# Patient Record
Sex: MALE | Race: WHITE | NOT HISPANIC OR LATINO | Employment: OTHER | URBAN - METROPOLITAN AREA
[De-identification: names, ages, dates, MRNs, and addresses within clinical notes are randomized per-mention and may not be internally consistent; named-entity substitution may affect disease eponyms.]

---

## 2017-06-02 PROBLEM — R07.9 CHEST PAIN: Status: ACTIVE | Noted: 2017-06-02

## 2017-11-26 ENCOUNTER — OFFICE VISIT (OUTPATIENT)
Dept: URGENT CARE | Facility: CLINIC | Age: 77
End: 2017-11-26
Payer: MEDICARE

## 2017-11-26 VITALS
SYSTOLIC BLOOD PRESSURE: 131 MMHG | OXYGEN SATURATION: 97 % | DIASTOLIC BLOOD PRESSURE: 73 MMHG | TEMPERATURE: 97 F | RESPIRATION RATE: 14 BRPM | HEART RATE: 59 BPM

## 2017-11-26 DIAGNOSIS — M70.21 OLECRANON BURSITIS OF RIGHT ELBOW: Primary | ICD-10-CM

## 2017-11-26 PROCEDURE — 99203 OFFICE O/P NEW LOW 30 MIN: CPT | Mod: S$GLB,,, | Performed by: PHYSICIAN ASSISTANT

## 2017-11-26 NOTE — PROGRESS NOTES
Subjective:       Patient ID: Indra Easley is a 76 y.o. male.    Vitals:  oral temperature is 97.1 °F (36.2 °C). His blood pressure is 131/73 and his pulse is 59 (abnormal). His respiration is 14 and oxygen saturation is 97%.     Chief Complaint: Joint Swelling    PT C/O RIGHT ELBOW SWELLING, NO KNOWN INJURY, TENDER TO TOUCH, PAIN WITH FLEXION    Arm Pain    The incident occurred 3 to 5 days ago. There was no injury mechanism. The pain is present in the right elbow. The quality of the pain is described as aching. The pain does not radiate. The pain is at a severity of 4/10. The pain is mild. Pertinent negatives include no chest pain, muscle weakness, numbness or tingling. Nothing aggravates the symptoms. He has tried NSAIDs for the symptoms. The treatment provided mild relief.     Review of Systems   Constitution: Negative for chills and fever.   HENT: Negative for sore throat.    Eyes: Negative for blurred vision.   Cardiovascular: Negative for chest pain.   Respiratory: Negative for shortness of breath.    Skin: Negative for rash.   Musculoskeletal: Positive for joint pain and joint swelling. Negative for back pain.   Gastrointestinal: Negative for abdominal pain, diarrhea, nausea and vomiting.   Neurological: Negative for headaches, numbness and tingling.   Psychiatric/Behavioral: The patient is not nervous/anxious.        Objective:      Physical Exam   Constitutional: He is oriented to person, place, and time. He appears well-developed and well-nourished. He is cooperative.  Non-toxic appearance. He does not appear ill. No distress.   HENT:   Head: Normocephalic and atraumatic.   Right Ear: Hearing, tympanic membrane, external ear and ear canal normal.   Left Ear: Hearing, tympanic membrane, external ear and ear canal normal.   Nose: Nose normal. No mucosal edema, rhinorrhea or nasal deformity. No epistaxis. Right sinus exhibits no maxillary sinus tenderness and no frontal sinus tenderness. Left sinus  exhibits no maxillary sinus tenderness and no frontal sinus tenderness.   Mouth/Throat: Uvula is midline, oropharynx is clear and moist and mucous membranes are normal. No trismus in the jaw. Normal dentition. No uvula swelling. No posterior oropharyngeal erythema.   Eyes: Conjunctivae and lids are normal. Right eye exhibits no discharge. Left eye exhibits no discharge. No scleral icterus.   Sclera clear bilat   Neck: Trachea normal, normal range of motion, full passive range of motion without pain and phonation normal. Neck supple.   Cardiovascular: Normal rate, regular rhythm, normal heart sounds, intact distal pulses and normal pulses.    Pulmonary/Chest: Effort normal and breath sounds normal. No respiratory distress.   Abdominal: Soft. Normal appearance and bowel sounds are normal. He exhibits no distension, no pulsatile midline mass and no mass. There is no tenderness.   Musculoskeletal: Normal range of motion. He exhibits no edema.        Right elbow: He exhibits swelling. He exhibits normal range of motion, no effusion, no deformity and no laceration. Tenderness found. Olecranon process tenderness noted. No radial head, no medial epicondyle and no lateral epicondyle tenderness noted.        Right wrist: He exhibits normal range of motion, no tenderness, no bony tenderness, no swelling, no effusion, no crepitus, no deformity and no laceration.        Right forearm: He exhibits no tenderness, no bony tenderness, no swelling, no edema, no deformity and no laceration.        Arms:       Right hand: He exhibits normal range of motion, no tenderness, no bony tenderness, normal two-point discrimination, normal capillary refill, no deformity, no laceration and no swelling. Normal sensation noted. Normal strength noted.   Mild pain with full flexion of R elbow; Edema noted over R olecranon process without erythema or warmth   Neurological: He is alert and oriented to person, place, and time. He exhibits normal muscle  tone. Coordination normal.   Skin: Skin is warm, dry and intact. He is not diaphoretic. No pallor.   Psychiatric: He has a normal mood and affect. His speech is normal and behavior is normal. Judgment and thought content normal. Cognition and memory are normal.   Nursing note and vitals reviewed.      Assessment:       1. Olecranon bursitis of right elbow        Plan:         Olecranon bursitis of right elbow        Bursitis of the Elbow (Olecranon)  Your elbow joint contains a small fluid-filled sac called a bursa. The bursa helps the muscles and tendons move smoothly over the bone. It also cushions and protects your elbow. Bursitis is when the bursa is inflamed or swollen. This is most often due to overuse of or injury to the elbow. Symptoms include swelling and pain. If the elbow is red and feels warm to the touch, the bursa itself may be infected.  In most cases, elbow bursitis resolves with medicine and self-care at home. It may take several weeks for the bursa to heal and the swelling to go away. In some cases, your healthcare provider may drain excess fluid from the bursa. Or, he or she may inject medicine directly into the bursa to help relieve symptoms. In severe cases, you may need surgery to remove the bursa may. If there is concern that the bursa is infected, your healthcare provider may prescribe antibiotics to treat the infection.    Home care  Your healthcare provider may prescribe medicine to help relieve pain and swelling. This may be an over-the-counter pain reliever or prescription pain medicine. Take all medicines as directed. To help treat or prevent infection, your provider may prescribe antibiotics. If these are prescribed, take them as directed until they are gone.  The following are general care guidelines:  · Apply an ice pack or bag of frozen peas wrapped in a thin towel to your elbow for 15 to 20 minutes at a time. Do this 3 to 4 times a day until pain and swelling improve.  · Keep your  elbow raised above the level of your heart whenever possible. This helps reduce swelling. When sitting or lying down, place your arm on a pillow that rests on your chest or on a pillow at your side.  · Use an elastic wrap around the elbow joint to compress the area while it is healing. Make the wrap snug but not tight to the point of causing pain.  · Rest your elbow to give it time to heal. You may need to wear an elbow pad to help protect and limit the movement of your elbow. During and after healing, avoid leaning on your elbows.  Follow-up care  Follow up with your healthcare provider, or as advised. If you have been referred to a specialist, make that appointment promptly.  When to seek medical advice  Call your healthcare provider right away if any of these occur:  · Fever of 100.4°F (38°C) or higher, or as advised  · Chills  · Increased pain, swelling, warmth, redness, or drainage from the joint  · Trouble moving the elbow joint  · Numbness or tingling in the hand  · Severe pain or swelling in forearm or hand  · Loss of pink color and slow return of color after squeezing fingertip or hand  Date Last Reviewed: 6/1/2016  © 8658-3843 [employer]. [address] [place] [address], [place] [address]. All rights reserved. This information is not intended as a substitute for professional medical care. Always follow your healthcare professional's instructions.      Please follow up with your Primary care provider within 2-5 days if your signs and symptoms have not resolved or worsen.     If your condition worsens or fails to improve we recommend that you receive another evaluation at the emergency room immediately or contact your primary medical clinic to discuss your concerns.   You must understand that you have received an Urgent Care treatment only and that you may be released before all of your medical problems are known or treated. You, the patient, will arrange for follow up care as instructed.

## 2017-11-26 NOTE — PATIENT INSTRUCTIONS
Bursitis of the Elbow (Olecranon)  Your elbow joint contains a small fluid-filled sac called a bursa. The bursa helps the muscles and tendons move smoothly over the bone. It also cushions and protects your elbow. Bursitis is when the bursa is inflamed or swollen. This is most often due to overuse of or injury to the elbow. Symptoms include swelling and pain. If the elbow is red and feels warm to the touch, the bursa itself may be infected.  In most cases, elbow bursitis resolves with medicine and self-care at home. It may take several weeks for the bursa to heal and the swelling to go away. In some cases, your healthcare provider may drain excess fluid from the bursa. Or, he or she may inject medicine directly into the bursa to help relieve symptoms. In severe cases, you may need surgery to remove the bursa may. If there is concern that the bursa is infected, your healthcare provider may prescribe antibiotics to treat the infection.    Home care  Your healthcare provider may prescribe medicine to help relieve pain and swelling. This may be an over-the-counter pain reliever or prescription pain medicine. Take all medicines as directed. To help treat or prevent infection, your provider may prescribe antibiotics. If these are prescribed, take them as directed until they are gone.  The following are general care guidelines:  · Apply an ice pack or bag of frozen peas wrapped in a thin towel to your elbow for 15 to 20 minutes at a time. Do this 3 to 4 times a day until pain and swelling improve.  · Keep your elbow raised above the level of your heart whenever possible. This helps reduce swelling. When sitting or lying down, place your arm on a pillow that rests on your chest or on a pillow at your side.  · Use an elastic wrap around the elbow joint to compress the area while it is healing. Make the wrap snug but not tight to the point of causing pain.  · Rest your elbow to give it time to heal. You may need to wear an  elbow pad to help protect and limit the movement of your elbow. During and after healing, avoid leaning on your elbows.  Follow-up care  Follow up with your healthcare provider, or as advised. If you have been referred to a specialist, make that appointment promptly.  When to seek medical advice  Call your healthcare provider right away if any of these occur:  · Fever of 100.4°F (38°C) or higher, or as advised  · Chills  · Increased pain, swelling, warmth, redness, or drainage from the joint  · Trouble moving the elbow joint  · Numbness or tingling in the hand  · Severe pain or swelling in forearm or hand  · Loss of pink color and slow return of color after squeezing fingertip or hand  Date Last Reviewed: 6/1/2016  © 0053-6655 [employer]. [address] [place] [address], [place] [address]. All rights reserved. This information is not intended as a substitute for professional medical care. Always follow your healthcare professional's instructions.      Please follow up with your Primary care provider within 2-5 days if your signs and symptoms have not resolved or worsen.     If your condition worsens or fails to improve we recommend that you receive another evaluation at the emergency room immediately or contact your primary medical clinic to discuss your concerns.   You must understand that you have received an Urgent Care treatment only and that you may be released before all of your medical problems are known or treated. You, the patient, will arrange for follow up care as instructed.

## 2017-11-28 ENCOUNTER — OFFICE VISIT (OUTPATIENT)
Dept: URGENT CARE | Facility: CLINIC | Age: 77
End: 2017-11-28
Payer: MEDICARE

## 2017-11-28 VITALS
BODY MASS INDEX: 33.13 KG/M2 | OXYGEN SATURATION: 96 % | DIASTOLIC BLOOD PRESSURE: 72 MMHG | SYSTOLIC BLOOD PRESSURE: 128 MMHG | WEIGHT: 250 LBS | HEART RATE: 62 BPM | HEIGHT: 73 IN | TEMPERATURE: 97 F | RESPIRATION RATE: 16 BRPM

## 2017-11-28 DIAGNOSIS — R23.3 ABNORMAL BRUISING: Primary | ICD-10-CM

## 2017-11-28 PROCEDURE — 99213 OFFICE O/P EST LOW 20 MIN: CPT | Mod: S$GLB,,, | Performed by: INTERNAL MEDICINE

## 2017-11-28 RX ORDER — FUROSEMIDE 20 MG/1
1 TABLET ORAL
COMMUNITY
Start: 2017-09-25 | End: 2019-11-07

## 2017-11-28 RX ORDER — CLOPIDOGREL BISULFATE 75 MG/1
1 TABLET ORAL DAILY
COMMUNITY
Start: 2017-08-22 | End: 2018-11-28

## 2017-11-28 NOTE — PROGRESS NOTES
"Subjective:       Patient ID: Indra Easley is a 76 y.o. male.    Vitals:  height is 6' 1" (1.854 m) and weight is 113.4 kg (250 lb). His oral temperature is 97.4 °F (36.3 °C). His blood pressure is 128/72 and his pulse is 62. His respiration is 16 and oxygen saturation is 96%.     Chief Complaint: Edema (Right forearm swelling )    Patient was seen 3 days ago for bursitis of the right elbow, patient now has bruising and swelling on right forearm due to the use of an ace wrap.  He has been using ice pack as well for bursal swelling, which has improved.  Today he noted swelling and bruising along his right forearm. He has been on Plavix for many years.       Edema   This is a new problem. The current episode started today. The problem occurs constantly. The problem has been gradually worsening. Pertinent negatives include no abdominal pain, chest pain, chills, fever, headaches, joint swelling, nausea, rash or vomiting.     Review of Systems   Constitution: Negative for chills and fever.   Eyes: Negative for blurred vision.   Cardiovascular: Negative for chest pain.   Respiratory: Negative for shortness of breath.    Skin: Negative for rash.   Musculoskeletal: Negative for back pain, joint pain and joint swelling.   Gastrointestinal: Negative for abdominal pain, diarrhea, nausea and vomiting.   Neurological: Negative for headaches.   Psychiatric/Behavioral: The patient is not nervous/anxious.        Objective:      Physical Exam   Musculoskeletal:   Right forearm:  Diffuse swelling and ecchymosis noted medially.  Good brachial and radial pulses.  Normal ROM of wrist and elbow.  No significant TTP, erythema or increase warmth.        Assessment:       1. Abnormal bruising        Plan:         Abnormal bruising      "

## 2017-11-28 NOTE — PATIENT INSTRUCTIONS
Soft Tissue Contusion  You have a contusion. This is also called a bruise. There is swelling and some bleeding under the skin. This injury generally takes a few days to a few weeks to heal.  During that time, the bruise will typically change in color from reddish, to purple-blue, to greenish-yellow, then to yellow-brown.  Home care  · Elevate the injured area to reduce pain and swelling. As much as possible, sit or lie down with the injured area raised about the level of your heart. This is especially important during the first 48 hours.  · Ice the injured area to help reduce pain and swelling. Wrap a cold source (ice pack or ice cubes in a plastic bag) in a thin towel. Apply to the bruised area for 20 minutes every 1 to 2 hours the first day. Continue this 3 to 4 times a day until the pain and swelling goes away.  · Unless another medication was prescribed, you can take acetaminophen, ibuprofen, or naproxen to control pain. (If you have chronic liver or kidney disease or ever had a stomach ulcer or GI bleeding, talk with your doctor before using these medicines.)  Follow up  Follow up with your health care provider or our staff as advised. Call if you are not better in 1 to 2 weeks.  When to seek medical advice   Call your health care provider right away if you have any of the following:  · Increased pain or swelling  · Bruise is on an arm or leg and arm or leg becomes cold, blue, numb or tingly  · Signs of infection: Warmth, drainage, or increased redness or pain around the contusion  · Inability to move the injured area or body part   · Bruise is near your eye and you have problems with your eyesight or eye   · Frequent bruising for unknown reasons    After icing for 48 hours, continue elevation with moist heat.

## 2017-11-29 ENCOUNTER — TELEPHONE (OUTPATIENT)
Dept: URGENT CARE | Facility: CLINIC | Age: 77
End: 2017-11-29

## 2018-11-28 PROBLEM — R07.9 CHEST PAIN: Status: RESOLVED | Noted: 2017-06-02 | Resolved: 2018-11-28

## 2019-10-09 ENCOUNTER — PATIENT MESSAGE (OUTPATIENT)
Dept: UROLOGY | Facility: CLINIC | Age: 79
End: 2019-10-09

## 2020-01-03 PROBLEM — R07.89 OTHER CHEST PAIN: Status: ACTIVE | Noted: 2020-01-03

## 2020-01-14 ENCOUNTER — OFFICE VISIT (OUTPATIENT)
Dept: NEPHROLOGY | Facility: CLINIC | Age: 80
End: 2020-01-14
Payer: MEDICARE

## 2020-01-14 VITALS
SYSTOLIC BLOOD PRESSURE: 98 MMHG | OXYGEN SATURATION: 98 % | HEART RATE: 62 BPM | BODY MASS INDEX: 34.01 KG/M2 | DIASTOLIC BLOOD PRESSURE: 66 MMHG | WEIGHT: 256.63 LBS | HEIGHT: 73 IN

## 2020-01-14 DIAGNOSIS — N28.1 RENAL CYST: Primary | ICD-10-CM

## 2020-01-14 PROCEDURE — 1159F MED LIST DOCD IN RCRD: CPT | Mod: ,,, | Performed by: INTERNAL MEDICINE

## 2020-01-14 PROCEDURE — 1159F PR MEDICATION LIST DOCUMENTED IN MEDICAL RECORD: ICD-10-PCS | Mod: ,,, | Performed by: INTERNAL MEDICINE

## 2020-01-14 PROCEDURE — 99204 PR OFFICE/OUTPT VISIT, NEW, LEVL IV, 45-59 MIN: ICD-10-PCS | Mod: S$PBB,,, | Performed by: INTERNAL MEDICINE

## 2020-01-14 PROCEDURE — 99999 PR PBB SHADOW E&M-EST. PATIENT-LVL III: CPT | Mod: PBBFAC,,, | Performed by: INTERNAL MEDICINE

## 2020-01-14 PROCEDURE — 99204 OFFICE O/P NEW MOD 45 MIN: CPT | Mod: S$PBB,,, | Performed by: INTERNAL MEDICINE

## 2020-01-14 PROCEDURE — 99213 OFFICE O/P EST LOW 20 MIN: CPT | Mod: PBBFAC,PO | Performed by: INTERNAL MEDICINE

## 2020-01-14 PROCEDURE — 99999 PR PBB SHADOW E&M-EST. PATIENT-LVL III: ICD-10-PCS | Mod: PBBFAC,,, | Performed by: INTERNAL MEDICINE

## 2020-01-14 RX ORDER — RANOLAZINE 500 MG/1
500 TABLET, EXTENDED RELEASE ORAL 2 TIMES DAILY
COMMUNITY
Start: 2020-01-13 | End: 2020-05-14

## 2020-01-14 NOTE — LETTER
January 14, 2020      Keven Wright MD  [address] [facility] [address]           [facility]  [address] [place] [address]  Phone: [phone]          Patient: Indra Easley   MR Number: [id number]   YOB: 1940   Date of Visit: 1/14/2020       Dear Dr. Keven Wright:    Thank you for referring Indra Easley to me for evaluation. Attached you will find relevant portions of my assessment and plan of care.    If you have questions, please do not hesitate to call me. I look forward to following Indra Easley along with you.    Sincerely,    Chico Zarco MD    Enclosure  CC:  No Recipients    If you would like to receive this communication electronically, please contact [email or web address] or [phone] to request more information on [email or web address] Link access.    For providers and/or their staff who would like to refer a patient to [facility], please contact us through our one-stop-shop provider referral line, [facility] , at [phone].    If you feel you have received this communication in error or would no longer like to receive these types of communications, please e-mail [email or web address]

## 2020-01-14 NOTE — PROGRESS NOTES
Subjective:       Patient ID: Indra Easley is a 79 y.o. White male who presents for new patient evaluation for chronic renal failure.    Indra Ealsey is referred by Andry Umana MD to be evaluated for chronic renal failure.  He has HTN and CAD.  He was found recently to have a large renal cyst on ultrasound.  He has no uremic or urinary symptoms and is in his usual state of health.  There have been no recent illnesses, hospitalizations or procedures.  He denies NSAIDs.      Review of Systems   Constitutional: Negative for appetite change, chills and fever.   Eyes: Negative for visual disturbance.   Respiratory: Negative for cough and shortness of breath.    Cardiovascular: Positive for chest pain (in december). Negative for leg swelling.   Gastrointestinal: Negative for diarrhea, nausea and vomiting.   Genitourinary: Negative for difficulty urinating, dysuria and hematuria.   Musculoskeletal: Positive for arthralgias (hands). Negative for myalgias.   Skin: Negative for rash.   Neurological: Negative for headaches.   Psychiatric/Behavioral: Negative for sleep disturbance.       The past medical, family and social histories were reviewed for this encounter.     Past Medical History:   Diagnosis Date    a CAD With H/O CABG In 2000 And Stenting     Dr. Keven Wright; He Had 2 More Stents In 11/2018 At [facility]; 1/27/17 [facility]/Angiogram (Dr. HANSA Wright) = (See Scanned Report); 9/20/16 LCST = Normal    a H/O Cardiac Arrest 07/2015 During [facility] Procedure     Dr. Keven Wright    a Mild Chronic Systolic Congestive Heart Failure     Dr. Keven Wright; 1/27/17 [facility]/Angiogram (Dr. HANSA Wright) = (See Scanned Report) Included EF 45-50% By Ventriculography    a Transient AFib During 07/2015 [facility] Procedure     Dr. Keven Wright    b Hypertension     c Hypercholesterolemia     Dr. Keven Wright Has Him On Repatha SQ Every Weeks; He Had Been On Crestor 10 Mg And Zetia; 5/10/16 Homocysteine Level = Normal    d Hyperglycemia      "11/7/19 And 10/2015 RXd Lifestyle Changes    e Bilateral Small Thyroid Cysts     3/8/17 Ordered A Repeat Thyroid U/S In 1 Month; 3/8/17 Neck U/S = (See Report)    f Weight Gain 03/2017     3/8/17 RXd Lifestyle Changes    i Obstructive Sleep Apnea (Possible Diagnosis)     j Chronic Abdominal Bloating     Dr. David Kidd On 10/27/16 Ordered Labs And F/U Clinic OV    j Gallbladder Sludge     Dr. David Kidd; 9/20/16 ABD U/S = GB Sludge And Multiple Small Bilateral renal Cysts    j GERD     Dr. David Kidd; Last EGD Was In 02/2016    j Gilbert's syndrome     Dr. David Kidd    j H/O Colon Polyps On 3/6/15 TC     Dr. David Kidd: "Repeat Tc In 5 YRs"    k Bilateral Renal Cysts     9/20/16 ABD U/S = GB Sludge And Multiple Small Bilateral renal Cysts    k BPH S/P TURP In Remote Past     Dr. Gerald Holley    k Elevated PSA Levels     Dr. Gerald Holley; With H/O Benign BXs    l Bilateral Hand Osteoarthritis     Dr. Juan Wray; 6/1/17 Left Hand XRays = (See Report)    l Bilateral Knee Osteoarthritis With Chronic Pain     6/1/17 Bilateral Knee XRays = (See Report); 6/1/17 RF, ELYSE, CPK, UA Level, And ESR = Normal    l Chronic Fatigue And Myalgias     11/22/16 Holding Crestor Did Not Help; 6/1/17 RF, ELYSE, CPK, UA Level, And ESR = Normal    l Left Thumb Base Foreign Body     Dr. Juan Banks; 6/1/17 Left Hand XRays = (See Report)    l Right Elbow Olecranon Bursitis     1/5/18 Referred To Dr. JULIANN Bunch; 12/14/17 RXd Bactrim-DS And Ceftin 500 Mg Bid X 14 Days, And A Medrol Pack    l Severe Bilateral Hand Osteoarthritis With Chronic Pain     6/2/17 Referred To Dr. Alejandro Johnson; 6/1/17 Bilateral Hand XRays = (See Report); 6/1/17 RF, ELYSE, CPK, UA Level, And ESR = Normal    o Left Cervical Lymphadenitis 03/2017     3/8/17 RXd Clindamycin 300 Mg Tid X 7 Days; 3/8/17 Ordered A Repeat Thyroid U/S In 1 Month; 3/8/17 Neck U/S = (See Report)    q H/O Mohs Surgery For Forehead BCCA In 03/2019     " Wellness Visit 5/6/2019      Past Surgical History:   Procedure Laterality Date    CORONARY ANGIOGRAPHY N/A 1/3/2020    Procedure: ANGIOGRAM, CORONARY ARTERY;  Surgeon: Keven Wright MD;  Location: [facility] CATH;  Service: Cardiology;  Laterality: N/A;    heart bypass      LEFT HEART CATHETERIZATION Left 1/3/2020    Procedure: CATHETERIZATION, HEART, LEFT;  Surgeon: Keven Wright MD;  Location: [facility] CATH;  Service: Cardiology;  Laterality: Left;    stents      TRANSURETHRAL RESECTION OF PROSTATE       Social History     Socioeconomic History    Marital status:      Spouse name: Laura    Number of children: 2    Years of education: Not on file    Highest education level: Not on file   Occupational History    Not on file   Social Needs    Financial resource strain: Not hard at all    Food insecurity:     Worry: Never true     Inability: Never true    Transportation needs:     Medical: No     Non-medical: No   Tobacco Use    Smoking status: Never Smoker    Smokeless tobacco: Never Used   Substance and Sexual Activity    Alcohol use: Yes     Alcohol/week: 1.0 standard drinks     Types: 1 Shots of liquor per week     Frequency: 4 or more times a week     Drinks per session: 1 or 2     Binge frequency: Never     Comment: per day    Drug use: No    Sexual activity: Not on file   Lifestyle    Physical activity:     Days per week: 4 days     Minutes per session: 60 min    Stress: Only a little   Relationships    Social connections:     Talks on phone: Three times a week     Gets together: Three times a week     Attends Mosque service: Not on file     Active member of club or organization: Yes     Attends meetings of clubs or organizations: More than 4 times per year     Relationship status:    Other Topics Concern    Not on file   Social History Narrative    Not on file     Current Outpatient Medications   Medication Sig    aspirin (ECOTRIN) 81 MG EC tablet Take 81 mg by mouth once  daily.    celecoxib (CELEBREX) 200 MG capsule Take 1 capsule (200 mg total) by mouth 2 (two) times daily as needed for Pain.    coenzyme Q10 (CO Q-10) 100 mg capsule Take 100 mg by mouth once daily.    dexlansoprazole (DEXILANT) 60 mg capsule Take 60 mg by mouth once daily.    finasteride (PROSCAR) 5 mg tablet Take 5 mg by mouth once daily.     multivitamin capsule Take 1 capsule by mouth once daily.    nebivolol (BYSTOLIC) 5 MG Tab Take 5 mg by mouth once daily.    prasugrel (EFFIENT) 10 mg Tab Take 10 mg by mouth once daily.    PSYLLIUM SEED (KONSYL ORAL) Take by mouth.    REPATHA SURECLICK 140 mg/mL PnIj every 14 (fourteen) days.     tamsulosin (FLOMAX) 0.4 mg Cp24 Take 0.4 mg by mouth once daily.    valsartan (DIOVAN) 80 MG tablet TAKE 1 TABLET DAILY     No current facility-administered medications for this visit.      Facility-Administered Medications Ordered in Other Visits   Medication    0.9%  NaCl infusion    methylPREDNISolone sodium succinate injection 125 mg       There were no vitals taken for this visit.    Objective:      Physical Exam   Constitutional: He is oriented to person, place, and time. He appears well-developed and well-nourished. No distress.   HENT:   Head: Normocephalic and atraumatic.   Eyes: Conjunctivae are normal.   Neck: Neck supple. No JVD present.   Cardiovascular: Normal rate, regular rhythm and normal heart sounds. Exam reveals no gallop and no friction rub.   No murmur heard.  Pulmonary/Chest: Effort normal and breath sounds normal. No respiratory distress. He has no wheezes. He has no rales.   Abdominal: Soft. Bowel sounds are normal. He exhibits no distension. There is no tenderness.   Musculoskeletal: He exhibits edema (trace-1+ BLE).   Neurological: He is alert and oriented to person, place, and time.   Skin: Skin is warm and dry. No rash noted.   Psychiatric: He has a normal mood and affect.   Vitals reviewed.      Assessment:       1. Renal cyst        Plan:    Return to clinic in 1 year.  Labs for next visit include rp and renal US.  Baseline creatinine is 0.9-1.0.  Blood pressure is controlled on the current regimen.  Continue current medications as prescribed and reviewed.   I reassured him with regards to his renal cyst and provided a handout on renal cysts.

## 2020-11-19 PROBLEM — R07.89 OTHER CHEST PAIN: Status: RESOLVED | Noted: 2020-01-03 | Resolved: 2020-11-19

## 2021-01-22 ENCOUNTER — PATIENT MESSAGE (OUTPATIENT)
Dept: ADMINISTRATIVE | Facility: OTHER | Age: 81
End: 2021-01-22

## 2021-05-25 ENCOUNTER — PATIENT MESSAGE (OUTPATIENT)
Dept: UROLOGY | Facility: CLINIC | Age: 81
End: 2021-05-25

## 2021-06-04 PROBLEM — Z95.1 POSTSURGICAL AORTOCORONARY BYPASS STATUS: Status: ACTIVE | Noted: 2021-06-04

## 2022-06-15 DIAGNOSIS — U07.1 COVID-19 VIRUS DETECTED: ICD-10-CM

## 2022-06-16 PROBLEM — U07.1 COVID-19 VIRUS INFECTION: Status: ACTIVE | Noted: 2022-06-16

## 2022-09-22 PROBLEM — E66.9 OBESITY, UNSPECIFIED: Status: ACTIVE | Noted: 2022-09-22

## 2022-09-22 PROBLEM — Z95.810 AICD (AUTOMATIC CARDIOVERTER/DEFIBRILLATOR) PRESENT: Status: ACTIVE | Noted: 2022-09-22

## 2022-09-22 PROBLEM — U07.1 COVID-19 VIRUS INFECTION: Status: RESOLVED | Noted: 2022-06-16 | Resolved: 2022-09-22

## 2022-09-22 PROBLEM — Z86.010 HISTORY OF COLON POLYPS: Status: ACTIVE | Noted: 2022-09-22

## 2022-09-22 PROBLEM — Z95.1 POSTSURGICAL AORTOCORONARY BYPASS STATUS: Status: RESOLVED | Noted: 2021-06-04 | Resolved: 2022-09-22

## 2022-09-22 PROBLEM — K59.09 CONSTIPATION, CHRONIC: Status: ACTIVE | Noted: 2022-09-22

## 2022-09-22 PROBLEM — Z86.16 HISTORY OF 2019 NOVEL CORONAVIRUS DISEASE (COVID-19): Status: ACTIVE | Noted: 2022-09-22

## 2022-09-22 PROBLEM — Z86.0100 HISTORY OF COLON POLYPS: Status: ACTIVE | Noted: 2022-09-22

## 2022-09-28 PROBLEM — E04.2 MULTIPLE THYROID NODULES: Status: ACTIVE | Noted: 2022-09-28

## 2022-11-22 PROBLEM — M81.0 OSTEOPOROSIS, SENILE: Status: ACTIVE | Noted: 2022-11-22

## 2023-10-25 PROBLEM — Z95.1 S/P 2-VESSEL CORONARY ARTERY BYPASS: Status: ACTIVE | Noted: 2023-10-25

## 2023-10-25 PROBLEM — Z00.00 PREVENTATIVE HEALTH CARE: Status: RESOLVED | Noted: 2023-10-25 | Resolved: 2023-10-25

## 2023-10-25 PROBLEM — Z86.010 HISTORY OF COLON POLYPS: Status: ACTIVE | Noted: 2023-10-25

## 2023-10-25 PROBLEM — Z86.0100 HISTORY OF COLON POLYPS: Status: RESOLVED | Noted: 2022-09-22 | Resolved: 2023-10-25

## 2023-10-25 PROBLEM — Z95.5 H/O HEART ARTERY STENT: Status: ACTIVE | Noted: 2023-10-25

## 2023-10-25 PROBLEM — Z86.010 HISTORY OF COLON POLYPS: Status: RESOLVED | Noted: 2022-09-22 | Resolved: 2023-10-25

## 2023-10-25 PROBLEM — Z00.00 PREVENTATIVE HEALTH CARE: Status: ACTIVE | Noted: 2023-10-25

## 2023-10-25 PROBLEM — Z86.0100 HISTORY OF COLON POLYPS: Status: ACTIVE | Noted: 2023-10-25

## 2023-10-29 PROBLEM — I65.23 BILATERAL CAROTID ARTERY STENOSIS: Status: ACTIVE | Noted: 2023-10-29

## 2024-02-26 PROBLEM — K64.4 HEMORRHOIDS, EXTERNAL: Status: ACTIVE | Noted: 2024-02-26

## 2024-05-07 PROBLEM — M46.1 SACROILIITIS, NOT ELSEWHERE CLASSIFIED: Status: ACTIVE | Noted: 2024-05-07

## 2024-05-07 PROBLEM — I70.0 AORTIC ATHEROSCLEROSIS: Status: ACTIVE | Noted: 2024-05-07

## 2024-06-17 ENCOUNTER — DOCUMENTATION ONLY (OUTPATIENT)
Dept: ADMINISTRATIVE | Facility: OTHER | Age: 84
End: 2024-06-17
Payer: MEDICARE

## 2024-06-20 NOTE — PROGRESS NOTES
2024      Gerald Holley M.D.   [address]   [place] [address]      RE: Indra Easley     MR#:  [id number]    :  1940       DX:   1. Ductal adenocarcinoma of the prostate.  High risk or very high risk prostate cancer.    Group clinical stage IIC.  cT2c (digital rectal examination shows right greater than left palpable nodules; MR is suspicious for right seminal vesicle involvement, although my finger could not reach the level of the seminal vesicles) cN0 (no definite lymphadenopathy, although there is a 10 mm lymph node in the right common iliac bifurcation) cM0.  Eugenio score of 4+4=8 involving three biopsies.  PSA corrected for Proscar = 16.6.              2.       220 cc size prostate.       Dear Gerald:     Your patient returns two years and five months after completing definitive pelvic irradiation delivered with 1.5 years of androgen deprivation therapy.  In May of 2023, PSA was less than 0.02.  In 2023, PSA was 0.06.  According to the patient, he saw you in follow up at that time.     Symptomatically, the patient is doing very well.  He has good bowel and bladder function.     Physical exam shows a man in no distress.  He continues to have a good performance status.  Digital rectal exam is unremarkable, and the previous palpable abnormalities in the right greater than left prostate have resolved.     Assessment and Plan:  Symptomatically, he is doing well.  Digital rectal examination is unremarkable.  I have ordered a PSA that will be performed in two days, .  The patient will be seeing you in November with PSA.  I have asked to see the patient in two years.     20 minutes was spent in follow up, of which five minutes was spent in record review, 10 minutes was spent directly with the patient, and five minutes was spent on documentation.     I appreciate the opportunity to consult on your patients.  Please call me with any questions or comments.      Sincerely,        Electronically Signed By:  DAVID Reed/Amanuel  DD:  06/17/2024  DT:  06/17/2024  Job #:  [id number]    CC:  Keven Wright M.D., [address] [place] [address], Fax: [phone]        Andry Umana M.D., [address] [place] [address], Fax: [phone]       ADDENDUM:  The June 19 PSA= 0.28.

## 2024-10-18 ENCOUNTER — HOSPITAL ENCOUNTER (OUTPATIENT)
Dept: RADIOLOGY | Facility: HOSPITAL | Age: 84
Discharge: HOME | End: 2024-10-18
Attending: NURSE PRACTITIONER
Payer: MEDICARE

## 2024-10-18 DIAGNOSIS — R05.8 COUGH WITH SPUTUM: ICD-10-CM

## 2024-10-18 DIAGNOSIS — R06.2 WHEEZING: ICD-10-CM

## 2024-10-18 PROCEDURE — 71046 X-RAY EXAM CHEST 2 VIEWS: CPT | Mod: TC,PN

## 2024-10-18 PROCEDURE — 71046 X-RAY EXAM CHEST 2 VIEWS: CPT | Mod: 26,,, | Performed by: RADIOLOGY
